# Patient Record
Sex: MALE | Race: WHITE | NOT HISPANIC OR LATINO | Employment: PART TIME | URBAN - METROPOLITAN AREA
[De-identification: names, ages, dates, MRNs, and addresses within clinical notes are randomized per-mention and may not be internally consistent; named-entity substitution may affect disease eponyms.]

---

## 2018-03-11 ENCOUNTER — HOSPITAL ENCOUNTER (EMERGENCY)
Facility: HOSPITAL | Age: 23
End: 2018-03-11
Attending: EMERGENCY MEDICINE
Payer: COMMERCIAL

## 2018-03-11 VITALS
SYSTOLIC BLOOD PRESSURE: 119 MMHG | HEIGHT: 72 IN | BODY MASS INDEX: 23.03 KG/M2 | WEIGHT: 170 LBS | OXYGEN SATURATION: 98 % | HEART RATE: 70 BPM | TEMPERATURE: 99 F | RESPIRATION RATE: 18 BRPM | DIASTOLIC BLOOD PRESSURE: 64 MMHG

## 2018-03-11 DIAGNOSIS — R45.89 ANXIETY ABOUT HEALTH: ICD-10-CM

## 2018-03-11 DIAGNOSIS — R00.2 PALPITATIONS: Primary | ICD-10-CM

## 2018-03-11 DIAGNOSIS — F19.10 SUBSTANCE ABUSE: ICD-10-CM

## 2018-03-11 LAB
ALBUMIN SERPL BCP-MCNC: 4.5 G/DL
ALP SERPL-CCNC: 92 U/L
ALT SERPL W/O P-5'-P-CCNC: 23 U/L
ANION GAP SERPL CALC-SCNC: 12 MMOL/L
AST SERPL-CCNC: 23 U/L
BASOPHILS # BLD AUTO: 0.01 K/UL
BASOPHILS NFR BLD: 0.1 %
BILIRUB SERPL-MCNC: 0.6 MG/DL
BUN SERPL-MCNC: 10 MG/DL
CALCIUM SERPL-MCNC: 9.9 MG/DL
CHLORIDE SERPL-SCNC: 105 MMOL/L
CO2 SERPL-SCNC: 23 MMOL/L
CREAT SERPL-MCNC: 1.1 MG/DL
DIFFERENTIAL METHOD: ABNORMAL
EOSINOPHIL # BLD AUTO: 0 K/UL
EOSINOPHIL NFR BLD: 0 %
ERYTHROCYTE [DISTWIDTH] IN BLOOD BY AUTOMATED COUNT: 12.6 %
EST. GFR  (AFRICAN AMERICAN): >60 ML/MIN/1.73 M^2
EST. GFR  (NON AFRICAN AMERICAN): >60 ML/MIN/1.73 M^2
GLUCOSE SERPL-MCNC: 122 MG/DL
HCT VFR BLD AUTO: 44.1 %
HGB BLD-MCNC: 15.5 G/DL
LYMPHOCYTES # BLD AUTO: 0.9 K/UL
LYMPHOCYTES NFR BLD: 5.5 %
MCH RBC QN AUTO: 31.3 PG
MCHC RBC AUTO-ENTMCNC: 35.1 G/DL
MCV RBC AUTO: 89 FL
MONOCYTES # BLD AUTO: 0.6 K/UL
MONOCYTES NFR BLD: 3.8 %
NEUTROPHILS # BLD AUTO: 15.3 K/UL
NEUTROPHILS NFR BLD: 90.3 %
PLATELET # BLD AUTO: 221 K/UL
PMV BLD AUTO: 10.7 FL
POTASSIUM SERPL-SCNC: 4.3 MMOL/L
PROT SERPL-MCNC: 7.5 G/DL
RBC # BLD AUTO: 4.95 M/UL
SODIUM SERPL-SCNC: 140 MMOL/L
WBC # BLD AUTO: 16.87 K/UL

## 2018-03-11 PROCEDURE — 93005 ELECTROCARDIOGRAM TRACING: CPT

## 2018-03-11 PROCEDURE — 80053 COMPREHEN METABOLIC PANEL: CPT

## 2018-03-11 PROCEDURE — 99285 EMERGENCY DEPT VISIT HI MDM: CPT | Mod: 25

## 2018-03-11 PROCEDURE — 85025 COMPLETE CBC W/AUTO DIFF WBC: CPT

## 2018-03-11 PROCEDURE — 25000003 PHARM REV CODE 250: Performed by: EMERGENCY MEDICINE

## 2018-03-11 PROCEDURE — 93010 ELECTROCARDIOGRAM REPORT: CPT | Mod: ,,, | Performed by: INTERNAL MEDICINE

## 2018-03-11 RX ORDER — HYDROXYZINE PAMOATE 25 MG/1
25 CAPSULE ORAL
Status: COMPLETED | OUTPATIENT
Start: 2018-03-11 | End: 2018-03-11

## 2018-03-11 RX ADMIN — HYDROXYZINE PAMOATE 25 MG: 25 CAPSULE ORAL at 01:03

## 2018-03-11 NOTE — ED PROVIDER NOTES
"Encounter Date: 3/11/2018    SCRIBE #1 NOTE: I, Neal Bello, am scribing for, and in the presence of,  Kingsley Puentes MD. I have scribed the following portions of the note - Other sections scribed: HPI, ROS, PE.       History     Chief Complaint   Patient presents with    Drug Overdose     Pt reports taking LSD tonight and "tripping out." Pt stated he felt like people were going to attack him.     CC: Drug Overdose    21 y/o male with no PMHx presents to the ED via EMS due to LSD overdose. The patient states that today after taking LSD, his "high" was longer than usual. The patient states "I feel like my heart is going to jump out of my chest. Like i'm going to explode." The patient states that he needs to have a BM now. The patient reports feeling SOB. The patient reports LSD dose on paper yesterday, and LSD dose in gel form 1x in the past that was "good." The patient reports smoking cigarettes. The patient denies drinking EtOH or IV drug abuse. The patient denies leg swelling, fever, cough, rhinorrhea, or N/V/D. No other symptoms reported.      The history is provided by the patient. No  was used.     Review of patient's allergies indicates:  Allergies not on file  No past medical history on file.  No past surgical history on file.  No family history on file.  Social History   Substance Use Topics    Smoking status: Not on file    Smokeless tobacco: Not on file    Alcohol use Not on file     Review of Systems   Constitutional: Negative for chills and fever.   HENT: Negative for rhinorrhea and sore throat.    Eyes: Negative for redness.   Respiratory: Positive for shortness of breath. Negative for cough.    Cardiovascular: Positive for palpitations. Negative for chest pain and leg swelling.   Gastrointestinal: Negative for abdominal pain, diarrhea, nausea and vomiting.   Genitourinary: Negative for difficulty urinating and dysuria.   Musculoskeletal: Negative for back pain.   Skin: " Negative for rash.   Neurological: Negative for headaches.       Physical Exam     Initial Vitals   BP Pulse Resp Temp SpO2   -- -- -- -- --      MAP       --         Physical Exam    Nursing note and vitals reviewed.  Constitutional: Vital signs are normal. He appears well-developed and well-nourished. He is active.  Non-toxic appearance. No distress.   HENT:   Head: Normocephalic and atraumatic.   Mouth/Throat: Oropharynx is clear and moist. He does not have dentures. Normal dentition. No dental caries.   No lesions. No dental problems.   Eyes: EOM are normal.   Pupils are dilated bilaterally. Pink conjunctiva. Sclera anicteric.   Neck: Trachea normal. Neck supple.   Cardiovascular: Regular rhythm. Tachycardia present.    No murmur heard.  Pulses:       Dorsalis pedis pulses are 1+ on the right side, and 1+ on the left side.   Pulmonary/Chest: Breath sounds normal. No respiratory distress. He has no wheezes.   Inspiratory and expiratory breath sounds are equal bilaterally.   Abdominal: Soft. Normal appearance and bowel sounds are normal. He exhibits no distension. There is no tenderness.   Musculoskeletal: Normal range of motion. He exhibits no edema.        Cervical back: He exhibits no tenderness.        Thoracic back: He exhibits no tenderness.        Lumbar back: He exhibits no tenderness.        Right hand: Decreased sensation is not present in the ulnar distribution, is not present in the medial distribution and is not present in the radial distribution. Motor /Testing: The patient has normal right wrist strength.        Left hand: Decreased sensation is not present in the ulnar distribution, is not present in the medial redistribution and is not present in the radial distribution. Motor /Testing: The patient has normal left wrist strength.        Right foot: Plantar foot sensation: normal.        Left foot: Plantar foot sensation: normal.   Tenderness to rhomboid and levator scapulae muscles of L sided  scapula.    Lymphadenopathy:   No lymphadenopathy.   Neurological: He is alert.   Cranial nerves II-XII in tact. Both sensory and motor neurons intact.   Skin: Skin is warm, dry and intact.   Abrasions noted to edge of trousers. No tenderness to palpation.   Psychiatric: He has a normal mood and affect.         ED Course   Procedures  Labs Reviewed - No data to display  EKG Readings: (Independently Interpreted)   Rhythm: Sinus Tachycardia. Heart Rate: 103. Axis: Normal.   The patient had normal ID, QTC, and QRS intervals. No acute ischemia or infarctions. The patient has ventricular hypertrophy without ventricular blocks.          Medical Decision Making:   History:   Old Medical Records: I decided to obtain old medical records.  Initial Assessment:   The patient has a BP of 149/71. His O2 stats are at 99%.  She presented from street he stated he felt that his legs were getting very very tired he could not longer stand up he had been on his feet since taking a paper dose of LSD and had been having very paranoid and worrisome thoughts further he was staring at him and he really didn't find a stable placed to be he asked for help and someone called for EMS.  He felt his heart was racing significantly and felt his heart was about to burst out of his chest.  he also has some shortness of breath no nausea no vomiting no sweats no chest pain    Differential Diagnosis:   Unknown polysubstance abuse, electrolyte imbalance, arrhythmias  Clinical Tests:   Lab Tests: Ordered  Medical Tests: Ordered  ED Management:  Basic lab screens will be ordered to rule out the other possible causes for his tachycardia.     Patient became more alert and felt that his trip was resolving, he had lower heart rate and his blood pressure  normalized     He has used LSD before but never had symptoms like this            Scribe Attestation:   Scribe #1: I performed the above scribed service and the documentation accurately describes the services I  performed. I attest to the accuracy of the note.    Attending Attestation:           Physician Attestation for Scribe:  Physician Attestation Statement for Scribe #1: I, Kingsley Puentes MD, reviewed documentation, as scribed by Neal Bello in my presence, and it is both accurate and complete.                    Clinical Impression:   The encounter diagnosis was Palpitations. substance abuse-likely LSD    Disposition:   Disposition: Discharged  Condition: Stable      I, Dr. Kingsley Alvarado, personally performed the services described in this documentation. All medical record entries made by the scribe were at my direction and in my presence.  I have reviewed the chart and agree that the record reflects my personal performance and is accurate and complete. Kingsley Alvarado MD.  3:51 AM 03/11/2018                        Kingsley Alvarado MD  03/11/18 [id number]

## 2018-03-11 NOTE — DISCHARGE INSTRUCTIONS
Your tests today show no signs of any injury to your heart.  EKG was normal.  Blood pressure had been dropping nicely since your arrival-it was originally high.   Your electrolytes and cell counts are all normal except given elevated white cell count which probably came around for some dehydration but may be also part of the reaction to your panic anxiety episode today